# Patient Record
(demographics unavailable — no encounter records)

---

## 2024-11-06 NOTE — PHYSICAL EXAM
[Midline] : trachea located in midline position [Normal] : no rashes [de-identified] : FOM skin graft intact and viable. Both SM ducts patent.  Superficial R lower lip white lesion is unchanged

## 2024-11-06 NOTE — CONSULT LETTER
[Dear  ___] : Dear  [unfilled], [Courtesy Letter:] : I had the pleasure of seeing your patient, [unfilled], in my office today. [Please see my note below.] : Please see my note below. [Consult Closing:] : Thank you very much for allowing me to participate in the care of this patient.  If you have any questions, please do not hesitate to contact me. [Sincerely,] : Sincerely, [FreeTextEntry2] : Nilson Mejia MD [FreeTextEntry3] : Farhat Car MD, FACS Chief of Otolaryngology and Head & Neck Surgery - [facility]  - Dept. of Otolaryngology - [facility] [phone]

## 2024-11-06 NOTE — HISTORY OF PRESENT ILLNESS
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  Pt reports no changes in R lip lesion since last visit.  Denies lip bleeding, pain, dyspnea, dysphagia, dysphonia, globus sensation, fever, chills, night sweats, unintentional weight loss.